# Patient Record
Sex: FEMALE | Race: WHITE
[De-identification: names, ages, dates, MRNs, and addresses within clinical notes are randomized per-mention and may not be internally consistent; named-entity substitution may affect disease eponyms.]

---

## 2021-01-13 ENCOUNTER — HOSPITAL ENCOUNTER (EMERGENCY)
Dept: HOSPITAL 92 - ERS | Age: 21
Discharge: HOME | End: 2021-01-13
Payer: COMMERCIAL

## 2021-01-13 DIAGNOSIS — N20.0: Primary | ICD-10-CM

## 2021-01-13 LAB
ALBUMIN SERPL BCG-MCNC: 4.4 G/DL (ref 3.5–5)
ALP SERPL-CCNC: 40 U/L (ref 40–110)
ALT SERPL W P-5'-P-CCNC: 11 U/L (ref 8–55)
ANION GAP SERPL CALC-SCNC: 14 MMOL/L (ref 10–20)
AST SERPL-CCNC: 20 U/L (ref 5–34)
BACTERIA UR QL AUTO: (no result) HPF
BASOPHILS # BLD AUTO: 0 THOU/UL (ref 0–0.2)
BASOPHILS NFR BLD AUTO: 0.7 % (ref 0–1)
BILIRUB SERPL-MCNC: 1.1 MG/DL (ref 0.2–1.2)
BUN SERPL-MCNC: 15 MG/DL (ref 7–18.7)
CALCIUM SERPL-MCNC: 8.9 MG/DL (ref 7.8–10.44)
CHLORIDE SERPL-SCNC: 107 MMOL/L (ref 98–107)
CO2 SERPL-SCNC: 21 MMOL/L (ref 22–29)
CREAT CL PREDICTED SERPL C-G-VRATE: 0 ML/MIN (ref 70–130)
EOSINOPHIL # BLD AUTO: 0 THOU/UL (ref 0–0.7)
EOSINOPHIL NFR BLD AUTO: 0.2 % (ref 0–10)
GLOBULIN SER CALC-MCNC: 2.6 G/DL (ref 2.4–3.5)
GLUCOSE SERPL-MCNC: 82 MG/DL (ref 70–105)
HGB BLD-MCNC: 13.6 G/DL (ref 12–16)
LEUKOCYTE ESTERASE UR QL STRIP.AUTO: 25 LEU/UL
LYMPHOCYTES # BLD: 0.8 THOU/UL (ref 1.2–3.4)
LYMPHOCYTES NFR BLD AUTO: 12.2 % (ref 21–51)
MCH RBC QN AUTO: 31.2 PG (ref 27–31)
MCV RBC AUTO: 93.9 FL (ref 78–98)
MONOCYTES # BLD AUTO: 0.3 THOU/UL (ref 0.11–0.59)
MONOCYTES NFR BLD AUTO: 4.6 % (ref 0–10)
NEUTROPHILS # BLD AUTO: 5.5 THOU/UL (ref 1.4–6.5)
NEUTROPHILS NFR BLD AUTO: 82.4 % (ref 42–75)
PLATELET # BLD AUTO: 157 THOU/UL (ref 130–400)
POTASSIUM SERPL-SCNC: 4.2 MMOL/L (ref 3.5–5.1)
PREGS CONTROL BACKGROUND?: (no result)
PREGS CONTROL BAR APPEAR?: YES
PROT UR STRIP.AUTO-MCNC: 10 MG/DL
RBC # BLD AUTO: 4.36 MILL/UL (ref 4.2–5.4)
SODIUM SERPL-SCNC: 138 MMOL/L (ref 136–145)
SP GR UR STRIP: 1.02 (ref 1–1.04)
WBC # BLD AUTO: 6.7 THOU/UL (ref 4.8–10.8)
WBC UR QL AUTO: (no result) HPF (ref 0–3)

## 2021-01-13 PROCEDURE — 81003 URINALYSIS AUTO W/O SCOPE: CPT

## 2021-01-13 PROCEDURE — 81015 MICROSCOPIC EXAM OF URINE: CPT

## 2021-01-13 PROCEDURE — 80053 COMPREHEN METABOLIC PANEL: CPT

## 2021-01-13 PROCEDURE — 96374 THER/PROPH/DIAG INJ IV PUSH: CPT

## 2021-01-13 PROCEDURE — 87086 URINE CULTURE/COLONY COUNT: CPT

## 2021-01-13 PROCEDURE — 36415 COLL VENOUS BLD VENIPUNCTURE: CPT

## 2021-01-13 PROCEDURE — 74176 CT ABD & PELVIS W/O CONTRAST: CPT

## 2021-01-13 PROCEDURE — 85025 COMPLETE CBC W/AUTO DIFF WBC: CPT

## 2021-01-13 PROCEDURE — 84703 CHORIONIC GONADOTROPIN ASSAY: CPT

## 2021-01-13 NOTE — CT
CT Stone  Protocol

1/13/2021 12:24 PM



HISTORY:

Right flank pain. Vomiting. History of kidney stones.



COMPARISON:

None.



Technique: 

Multiple contiguous axial CT images are obtained through the abdomen and pelvis without IV contrast. 
Coronal reformats are provided.



FINDINGS:

This examination is limited for the evaluation of solid organs and vascular structures due to the lac
k of intravenous contrast.



Lower Chest: Lung bases are clear.



Liver: Grossly normal non-enhanced CT appearance.

Gallbladder: Mild increased density layering within the gallbladder lumen which could be related to g
allbladder sludge. Gallbladder calculi would be difficult to entirely exclude.

Pancreas: Grossly normal nonenhanced CT appearance.

Spleen: Grossly normal nonenhanced CT appearance.

Adrenals: Grossly normal nonenhanced CT appearance.



Kidneys and ureters: There multiple punctate nonobstructing right renal calculi seen. No left renal c
alculus is visualized. There is no right hydronephrosis, but there is mild dilatation of the right

ureter with tapering of the ureter in the lower pelvis. Multiple unopacified structures are seen in t
he pelvis which limits adequate evaluation. There were a few calcifications in the right hemipelvis

one of which represents a phlebolith, but there is a calcification seen in the expected location of t
he distal right ureter and right UVJ measuring 4 mm which may represent a distal right ureteral

calculus. No calcification was seen in this region on prior abdominal radiograph in 2019. Retrograde 
study may be helpful for further evaluation.



No left renal or ureteral calculus is seen.



Urinary bladder: Incompletely distended but otherwise normal in appearance.



Reproductive Organs: No pelvic masses.



Lymph Nodes: No enlarged lymph nodes.



Bowel: Normal caliber.

Appendix: The appendix is normal in caliber. 



Peritoneum: No free fluid, free air, or  fluid collection.

Retroperitoneum: within normal limits.



Vessels: Abdominal aorta is normal in caliber..



Abdominal Wall: within normal limits.

Bones: No lytic or sclerotic osseous lesions.  



IMPRESSION:



1. Mild dilatation right ureter without overt right hydronephrosis. There is tapering of the distal r
ight ureter. Calcification is seen in the right hemipelvis near the region of tapering of the

ureter, and while this could represent a phlebolith, this calcification was not present on prior abdo
rusty radiograph in 2019 and may represent a distal right ureteral calculus measuring 4 mm.

2. Multiple tiny punctate obstructing right renal calculi.

3. No left renal or ureteral calculus is seen. 

4. Increased density within the gallbladder lumen which may represent gallbladder sludge. Gallbladder
 calculi would be difficult to entirely exclude. The gallbladder is not distended.



Reported By: Jose Antonio Olivares 

Electronically Signed:  1/13/2021 12:43 PM